# Patient Record
Sex: FEMALE | Race: WHITE | NOT HISPANIC OR LATINO | URBAN - METROPOLITAN AREA
[De-identification: names, ages, dates, MRNs, and addresses within clinical notes are randomized per-mention and may not be internally consistent; named-entity substitution may affect disease eponyms.]

---

## 2019-07-24 ENCOUNTER — OFFICE VISIT (OUTPATIENT)
Dept: URBAN - METROPOLITAN AREA CLINIC 33 | Facility: CLINIC | Age: 84
End: 2019-07-24
Payer: MEDICARE

## 2019-07-24 DIAGNOSIS — E11.9 TYPE 2 DIABETES MELLITUS W/O COMPLICATION: Primary | ICD-10-CM

## 2019-07-24 PROCEDURE — 99203 OFFICE O/P NEW LOW 30 MIN: CPT | Performed by: OPTOMETRIST

## 2019-07-24 PROCEDURE — 99214 OFFICE O/P EST MOD 30 MIN: CPT | Performed by: OPTOMETRIST

## 2019-07-24 RX ORDER — LATANOPROST 50 UG/ML
0.005 % SOLUTION OPHTHALMIC
Qty: 5 | Refills: 6 | Status: INACTIVE
Start: 2019-07-24 | End: 2019-09-09

## 2019-07-24 RX ORDER — DORZOLAMIDE HCL 20 MG/ML
2 % SOLUTION/ DROPS OPHTHALMIC
Qty: 5 | Refills: 12 | Status: INACTIVE
Start: 2019-07-24 | End: 2019-09-09

## 2019-07-24 ASSESSMENT — INTRAOCULAR PRESSURE
OD: 38
OS: 23
OD: 41
OS: 25

## 2019-07-24 ASSESSMENT — KERATOMETRY
OS: 44.13
OD: 44.00

## 2019-07-24 NOTE — IMPRESSION/PLAN
Impression: Type 2 diabetes mellitus w/o complication: N74.7. Plan: Diabetes w/o Complications: No signs of diabetic retinopathy noted. No treatment necessary at this time. Patient was instructed to monitor vision for sudden changes and to call if visual changes noted. Discussed ocular and systemic benefits of blood sugar control. Continue management with PCP. Letter sent to PCP regarding status of ocular health.

## 2019-07-24 NOTE — IMPRESSION/PLAN
Impression: Ocular hypertension, bilateral: H40.053. Last HVF 04/2016. SLT 02/2016 HVF 04/2016, /17, 12/17 OCT 76/81 6/7 Plan: IOP 38/23 without medication. Patient reports discontinuing both glaucoma drops. Recommend patient restart Latanoprost OU QHS and Dorzolamide OU BID. RTC with Dr Ericka Solares for IOP check and HVF 24-2 in 3-4 weeks. RXd Dorzolamide and Latanoprost

## 2019-08-21 ENCOUNTER — OFFICE VISIT (OUTPATIENT)
Dept: URBAN - METROPOLITAN AREA CLINIC 33 | Facility: CLINIC | Age: 84
End: 2019-08-21
Payer: MEDICARE

## 2019-08-21 PROCEDURE — 92133 CPTRZD OPH DX IMG PST SGM ON: CPT | Performed by: OPHTHALMOLOGY

## 2019-08-21 PROCEDURE — 92083 EXTENDED VISUAL FIELD XM: CPT | Performed by: OPHTHALMOLOGY

## 2019-08-21 PROCEDURE — 99213 OFFICE O/P EST LOW 20 MIN: CPT | Performed by: OPHTHALMOLOGY

## 2019-08-21 ASSESSMENT — INTRAOCULAR PRESSURE
OD: 26
OS: 27

## 2019-08-21 NOTE — IMPRESSION/PLAN
Impression: Ocular hypertension, bilateral: H40.053
- Tmax 41
-Goal 27 below Plan:  Intraocular pressure well controlled, tolerating medications. Will continue with same regimen. Pt obtained HVF and OCT today. Stable OU. Reviewed with Patient. Will continue to Monitor. 
Gtts:  Latanoprost OU QHS and Dorzolamide OU BID
RTC: 3 month IOP

## 2019-12-11 ENCOUNTER — OFFICE VISIT (OUTPATIENT)
Dept: URBAN - METROPOLITAN AREA CLINIC 33 | Facility: CLINIC | Age: 84
End: 2019-12-11
Payer: MEDICARE

## 2019-12-11 PROCEDURE — 99213 OFFICE O/P EST LOW 20 MIN: CPT | Performed by: OPHTHALMOLOGY

## 2019-12-11 RX ORDER — DORZOLAMIDE HYDROCHLORIDE AND TIMOLOL MALEATE 20; 5 MG/ML; MG/ML
SOLUTION/ DROPS OPHTHALMIC
Qty: 1 | Refills: 6 | Status: INACTIVE
Start: 2019-12-11 | End: 2019-12-11

## 2019-12-11 ASSESSMENT — INTRAOCULAR PRESSURE
OD: 40
OS: 32

## 2019-12-11 NOTE — IMPRESSION/PLAN
Impression: Ocular hypertension, bilateral: H40.053
- Tmax 41
-Goal 27 below Plan: IOP elevated today but Last VF no changes or defects. Need to manage and lower IOP will change drop regimen, Discussed possible SLT in future. Last SLT 3 years ago. .  Discussed selective laser trabeculoplasty as an option. Reviewed risks and benefits of selective laser trabeculoplasty including 20% chance of laser not reducing intraocular pressure and continued need for eye drops. Will have patient START Cosopt BID OU & continue  Latanoprost QHS OU
STOP Dorzolamide. Patient expressed understanding.   Intraocular pressure slightly elevated on medications

## 2019-12-17 ENCOUNTER — OFFICE VISIT (OUTPATIENT)
Dept: URBAN - METROPOLITAN AREA CLINIC 33 | Facility: CLINIC | Age: 84
End: 2019-12-17
Payer: MEDICARE

## 2019-12-17 PROCEDURE — 99213 OFFICE O/P EST LOW 20 MIN: CPT | Performed by: OPHTHALMOLOGY

## 2019-12-17 ASSESSMENT — INTRAOCULAR PRESSURE
OS: 18
OD: 24
OS: 20

## 2019-12-17 NOTE — IMPRESSION/PLAN
Impression: Ocular hypertension, bilateral: H40.053
- Tmax 41
-Goal 27 below Plan: IOP improved today. Continue drops as directed. Discussed possible SLT in future. Last SLT 3 years ago. .  Discussed selective laser trabeculoplasty as an option. Reviewed risks and benefits of selective laser trabeculoplasty including 20% chance of laser not reducing intraocular pressure and continued need for eye drops. Will have patient continue Cosopt BID OU & continue  Latanoprost QHS OU
STOP Dorzolamide. Patient expressed understanding.   Intraocular pressure slightly elevated on medications

## 2020-06-23 ENCOUNTER — OFFICE VISIT (OUTPATIENT)
Dept: URBAN - METROPOLITAN AREA CLINIC 33 | Facility: CLINIC | Age: 85
End: 2020-06-23
Payer: MEDICARE

## 2020-06-23 PROCEDURE — 99213 OFFICE O/P EST LOW 20 MIN: CPT | Performed by: OPHTHALMOLOGY

## 2020-06-23 PROCEDURE — 92133 CPTRZD OPH DX IMG PST SGM ON: CPT | Performed by: OPHTHALMOLOGY

## 2020-06-23 ASSESSMENT — INTRAOCULAR PRESSURE
OD: 26
OS: 24

## 2020-06-23 NOTE — IMPRESSION/PLAN
Impression: Ocular hypertension, bilateral: H40.053
- Tmax 41
-Goal 27 below Plan: Elevated IOP today. Discussed possible SLT in future- Will see if Dr Ana Miller or Dr Braulio Cohen can do SLT for patient. Last SLT 2006 /ALT ? 15 years ago. Discussed selective laser trabeculoplasty as an option. Reviewed risks and benefits of selective laser trabeculoplasty including 20% chance of laser not reducing intraocular pressure and continued need for eye drops. Will have patient continue Cosopt BID OU & continue  Latanoprost QHS OU Pt to continue artificial tears ou. Patient expressed understanding. Intraocular pressure slightly elevated on medications and patient is not happy with use of drops they irritate her eyes.

## 2020-09-29 ENCOUNTER — OFFICE VISIT (OUTPATIENT)
Dept: URBAN - METROPOLITAN AREA CLINIC 33 | Facility: CLINIC | Age: 85
End: 2020-09-29
Payer: MEDICARE

## 2020-09-29 PROCEDURE — 99213 OFFICE O/P EST LOW 20 MIN: CPT | Performed by: OPHTHALMOLOGY

## 2020-09-29 ASSESSMENT — INTRAOCULAR PRESSURE
OD: 27
OS: 24

## 2020-09-29 NOTE — IMPRESSION/PLAN
Impression: Ocular hypertension, bilateral: H40.053
- Tmax 41
-Goal 27 below
-Last SLT 2006 /ALT ? 15 years ago Plan: Intraocular pressure slightly elevated on medications and patient is not happy with use of drops they irritate her eyes. Discussed possible SLT in future-  Will call to coordinate with patient if can get mobile SLT to [id number] [facility] for Dr Efren Spence to do if not Will have patient see Dr Braulio Cohen for consult at [facility] or any [facility] office and SLT procedure. . Dr Efren Spence will be in contact with Dr Braulio Cohen. Patient to continue Cosopt BID OU & Latanoprost QHS OU as well as artificial tears BID ou.

## 2020-10-05 ENCOUNTER — CONSULT (OUTPATIENT)
Dept: URBAN - METROPOLITAN AREA CLINIC 10 | Facility: CLINIC | Age: 85
End: 2020-10-05
Payer: MEDICARE

## 2020-10-05 PROCEDURE — 99204 OFFICE O/P NEW MOD 45 MIN: CPT | Performed by: OPHTHALMOLOGY

## 2020-10-05 PROCEDURE — 65855 TRABECULOPLASTY LASER SURG: CPT | Performed by: OPHTHALMOLOGY

## 2020-10-05 PROCEDURE — 92020 GONIOSCOPY: CPT | Performed by: OPHTHALMOLOGY

## 2020-10-05 PROCEDURE — 76514 ECHO EXAM OF EYE THICKNESS: CPT | Performed by: OPHTHALMOLOGY

## 2020-10-05 ASSESSMENT — INTRAOCULAR PRESSURE
OS: 21
OD: 28

## 2020-10-27 ENCOUNTER — OFFICE VISIT (OUTPATIENT)
Dept: URBAN - METROPOLITAN AREA CLINIC 33 | Facility: CLINIC | Age: 85
End: 2020-10-27
Payer: MEDICARE

## 2020-10-27 PROCEDURE — 99213 OFFICE O/P EST LOW 20 MIN: CPT | Performed by: OPHTHALMOLOGY

## 2020-10-27 RX ORDER — DORZOLAMIDE HYDROCHLORIDE AND TIMOLOL MALEATE 20; 5 MG/ML; MG/ML
SOLUTION/ DROPS OPHTHALMIC
Qty: 1 | Refills: 1 | Status: INACTIVE
Start: 2020-10-27 | End: 2021-03-22

## 2020-10-27 RX ORDER — LATANOPROST 50 UG/ML
0.005 % SOLUTION OPHTHALMIC
Qty: 1 | Refills: 1 | Status: INACTIVE
Start: 2020-10-27 | End: 2020-12-14

## 2020-10-27 ASSESSMENT — INTRAOCULAR PRESSURE
OS: 24
OD: 43

## 2020-10-27 NOTE — IMPRESSION/PLAN
Impression: Ocular hypertension, bilateral: H40.053
- Tmax 41
-Goal 27 below
-Last SLT 2006 /ALT ? 15 years ago Plan: Intraocular pressure slightly elevated on only Latanoprost qhs ou. Pt stopped Dorz/Timolol since SLT Laser with Dr Iron Machado on 10/5/20. Explained to patient importance of controlled IOP and Drop usage. Will have Pt RESTART Dorzolamide/ Timolol BID OU & Latanoprost QHS OU Recheck IOP in 1 week

## 2020-11-10 ENCOUNTER — OFFICE VISIT (OUTPATIENT)
Dept: URBAN - METROPOLITAN AREA CLINIC 33 | Facility: CLINIC | Age: 85
End: 2020-11-10
Payer: MEDICARE

## 2020-11-10 PROCEDURE — 99213 OFFICE O/P EST LOW 20 MIN: CPT | Performed by: OPHTHALMOLOGY

## 2020-11-10 ASSESSMENT — INTRAOCULAR PRESSURE
OD: 34
OS: 25

## 2020-11-10 NOTE — IMPRESSION/PLAN
Impression: Ocular hypertension, bilateral: H40.053
- Tmax 41
-Goal 27 below
-Last SLT 2006 /ALT ? 15 years ago Plan: Intraocular pressure slightly elevated on only Dorzolamide/Timolol BID for 4 days only was using Dorzolamide and Latanoprost qhs ou. Explained to patient importance of controlled IOP and Drop usage. Will have Pt Recheck IOP in 1 month

## 2021-01-19 ENCOUNTER — OFFICE VISIT (OUTPATIENT)
Dept: URBAN - METROPOLITAN AREA CLINIC 33 | Facility: CLINIC | Age: 86
End: 2021-01-19
Payer: MEDICARE

## 2021-01-19 PROCEDURE — 99213 OFFICE O/P EST LOW 20 MIN: CPT | Performed by: OPHTHALMOLOGY

## 2021-01-19 ASSESSMENT — INTRAOCULAR PRESSURE
OD: 25
OS: 23

## 2021-01-19 NOTE — IMPRESSION/PLAN
Impression: Ocular hypertension, bilateral: H40.053
- Tmax 41
-Goal 27 below
-Last SLT 2006 /ALT ? 15 years ago Plan: Intraocular pressure improved on Dorzolamide/Timolol BID OU and Latanoprost QHS OU. Tolerating medications. Will continue to monitor. Explained to patient importance of controlled IOP and Drop usage.  
RTC: 3-4 months IOP

## 2021-04-20 ENCOUNTER — OFFICE VISIT (OUTPATIENT)
Dept: URBAN - METROPOLITAN AREA CLINIC 33 | Facility: CLINIC | Age: 86
End: 2021-04-20
Payer: MEDICARE

## 2021-04-20 PROCEDURE — 99213 OFFICE O/P EST LOW 20 MIN: CPT | Performed by: OPHTHALMOLOGY

## 2021-04-20 RX ORDER — DORZOLAMIDE HYDROCHLORIDE AND TIMOLOL MALEATE 20; 5 MG/ML; MG/ML
SOLUTION/ DROPS OPHTHALMIC
Qty: 3 | Refills: 1 | Status: INACTIVE
Start: 2021-04-20 | End: 2022-02-24

## 2021-04-20 RX ORDER — LATANOPROST 50 UG/ML
0.005 % SOLUTION OPHTHALMIC
Qty: 3 | Refills: 1 | Status: INACTIVE
Start: 2021-04-20 | End: 2021-09-07

## 2021-04-20 RX ORDER — NETARSUDIL 0.2 MG/ML
0.02 % SOLUTION/ DROPS OPHTHALMIC; TOPICAL
Qty: 3 | Refills: 1 | Status: INACTIVE
Start: 2021-04-20 | End: 2022-01-18

## 2021-04-20 ASSESSMENT — INTRAOCULAR PRESSURE
OD: 35
OS: 25

## 2021-04-20 NOTE — IMPRESSION/PLAN
Impression: Ocular hypertension, bilateral: H40.053
- Tmax 41
-Goal 27 below
-Last SLT 2006 /ALT ? 15 years ago Plan: Intraocular pressure elevated OD>OS. On Dorzolamide/Timolol BID OU and Latanoprost QHS OU. Tolerating medications. Will continue to monitor and continue  Dorz/timolol, Latanoprost QHS and will add Rhopressa QHS OU. Explained to patient importance of controlled IOP and Drop usage.  
RTC: 6 weeks IOP/VFT

## 2021-06-08 ENCOUNTER — OFFICE VISIT (OUTPATIENT)
Dept: URBAN - METROPOLITAN AREA CLINIC 33 | Facility: CLINIC | Age: 86
End: 2021-06-08
Payer: MEDICARE

## 2021-06-08 DIAGNOSIS — H40.053 OCULAR HYPERTENSION, BILATERAL: Primary | ICD-10-CM

## 2021-06-08 DIAGNOSIS — H40.013 OPEN ANGLE WITH BORDERLINE FINDINGS, LOW RISK, BILATERAL: ICD-10-CM

## 2021-06-08 PROCEDURE — 92083 EXTENDED VISUAL FIELD XM: CPT | Performed by: OPHTHALMOLOGY

## 2021-06-08 PROCEDURE — 99213 OFFICE O/P EST LOW 20 MIN: CPT | Performed by: OPHTHALMOLOGY

## 2021-06-08 ASSESSMENT — INTRAOCULAR PRESSURE
OD: 30
OS: 21

## 2021-06-08 NOTE — IMPRESSION/PLAN
Impression: Ocular hypertension, bilateral: H40.053
- Tmax 41
-Goal 27 below
-Last SLT 2006 /ALT ? 15 years ago Plan: Intraocular pressure decreased OD>OS. VF is stable. On Dorzolamide/Timolol BID OU , Latanoprost QHS OU,  Rhopressa QHS OU. Tolerating medications. Will continue to monitor. Explained to patient importance of controlled IOP and Drop usage.  
RTC: 4 months IOP/DFE

## 2022-01-18 ENCOUNTER — OFFICE VISIT (OUTPATIENT)
Dept: URBAN - METROPOLITAN AREA CLINIC 33 | Facility: CLINIC | Age: 87
End: 2022-01-18
Payer: MEDICARE

## 2022-01-18 DIAGNOSIS — H04.123 DRY EYE SYNDROME OF BILATERAL LACRIMAL GLANDS: ICD-10-CM

## 2022-01-18 DIAGNOSIS — H43.813 VITREOUS DEGENERATION, BILATERAL: ICD-10-CM

## 2022-01-18 DIAGNOSIS — H02.831 DERMATOCHALASIS OF RIGHT UPPER EYELID: ICD-10-CM

## 2022-01-18 DIAGNOSIS — H02.834 DERMATOCHALASIS OF LEFT UPPER EYELID: ICD-10-CM

## 2022-01-18 PROCEDURE — 99214 OFFICE O/P EST MOD 30 MIN: CPT | Performed by: OPHTHALMOLOGY

## 2022-01-18 RX ORDER — NETARSUDIL 0.2 MG/ML
0.02 % SOLUTION/ DROPS OPHTHALMIC; TOPICAL
Qty: 3 | Refills: 1 | Status: INACTIVE
Start: 2022-01-18 | End: 2022-02-24

## 2022-01-18 ASSESSMENT — INTRAOCULAR PRESSURE
OD: 39
OS: 22
OD: 34

## 2022-01-18 ASSESSMENT — VISUAL ACUITY
OS: 20/30
OD: 20/30

## 2022-01-18 NOTE — IMPRESSION/PLAN
Impression: Type 2 diabetes mellitus w/o complication: D96.4. Plan: Diabetes type II: no background retinopathy, no signs of neovascularization noted. Discussed ocular and systemic benefits of blood sugar control.

## 2022-01-18 NOTE — IMPRESSION/PLAN
Impression: Ocular hypertension, bilateral: H40.053. Plan: Patient's IOP continues to rises. She is on maximal medical treatment and hx of SLT (several times, most recently with Dr. Christina Dakins 2 years ago_. HVF in the past has not shown any defects. Patient has not been able to come in past few months for f/u since she has gotten three cardiac surgeries. Will continue current treatment and refer back to Dr. Demario Coker for further evaluation and management. She is unable to travel back to [facility] to see Dr. Christina Dakins. She understands that chronic elevated IOP can lead to blindess without appropriate treatment.

## 2022-01-18 NOTE — IMPRESSION/PLAN
Impression: Dermatochalasis of left upper eyelid: H02.834. Plan: patient notes that ptosis is affecting vision. She is requesting oculoplastics consult for possible surgical repair. Will refer to Dr. Tyrone Scott for evaluation.

## 2022-06-08 ENCOUNTER — OFFICE VISIT (OUTPATIENT)
Dept: URBAN - METROPOLITAN AREA CLINIC 33 | Facility: CLINIC | Age: 87
End: 2022-06-08
Payer: MEDICARE

## 2022-06-08 DIAGNOSIS — H40.053 OCULAR HYPERTENSION, BILATERAL: Primary | ICD-10-CM

## 2022-06-08 DIAGNOSIS — H40.1131 BILATERAL PRIMARY OPEN-ANGLE GLAUCOMA, MILD STAGE: ICD-10-CM

## 2022-06-08 PROCEDURE — 99214 OFFICE O/P EST MOD 30 MIN: CPT | Performed by: OPHTHALMOLOGY

## 2022-06-08 PROCEDURE — 76514 ECHO EXAM OF EYE THICKNESS: CPT | Performed by: OPHTHALMOLOGY

## 2022-06-08 PROCEDURE — 92133 CPTRZD OPH DX IMG PST SGM ON: CPT | Performed by: OPHTHALMOLOGY

## 2022-06-08 PROCEDURE — 92083 EXTENDED VISUAL FIELD XM: CPT | Performed by: OPHTHALMOLOGY

## 2022-06-08 RX ORDER — TIMOLOL MALEATE 5 MG/ML
0.5 % SOLUTION/ DROPS OPHTHALMIC
Qty: 10 | Refills: 3 | Status: ACTIVE
Start: 2022-06-08

## 2022-06-08 RX ORDER — LATANOPROST 50 UG/ML
0.005 % SOLUTION OPHTHALMIC
Qty: 7.5 | Refills: 3 | Status: ACTIVE
Start: 2022-06-08

## 2022-06-08 ASSESSMENT — INTRAOCULAR PRESSURE
OD: 74
OS: 34

## 2022-06-08 NOTE — IMPRESSION/PLAN
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 Plan: PT HAS POAG OU     GONIO :         PACHS:  544/550 PT WAS REFERRED BY DR. Jeane Perea TO CONTINUE [address] CARE  
THE RIGHT EYE IS THE POORER SEEING EYE LEFT EYE IS THE BETTER SEEING EYE
*****THE PATIENT STOPPED ALL GLAUCOMA DROPS SEVERAL MONTHS PRIOR TO HER VISIT ON  06/08/22********
PT DENIES FAMILY HX OF GLAUCOMA
PT DENIES SULFA ALLERGY
PT DENIES ASTHMA TARGET IOP LOW TEENS OR LESS 
RECOMMEND : 
1. SUGGEST PT  RE START  GLAUCOMA DROPS DUE TO HIGH IOP 6/8/22 2. VF TODAY SHOWS SIGNIFICANT PROGRESSION OD>OS COMPARISON TO VF ON 08/21/21 (06/08/22) 3. RE-START LATANOPROST OU QHS ( 06/08/22) 4. START TIMOLOL 1/2 OU QAM (START 6/8/22) 5. PT DOES NOT WISH TO RE-START DORZOLAMIDE OR RHOPRESSA OR BROMONIDIN (06/08/22) 5. OFFERED PT OPTION OF EMERGENT TRABECULECTOMY OD AND IF NEEDED OS, PT DEFERS SURGERY TO EITHER EYE 6/8/22 6. THE PT IS AWARE THAT THE CURRENT IOP LEVEL IN THE RIGHT EYE IS SEVERELY ELEVATED AND CAN LEAD TO COMPLETE SIGHT LOSS OVER A VERY BRIEF PERIOD OF TIME AND EVEN IN LIGHT OF THIS KNOWLEDGE, ADAMANTLY DEFERS SURGICAL INTERVENTION. 7. RECOMMEND THE PT RETURN IN 1 MONTH OR SOONER IF SHE WISHES TO CONSIDER EMERGENT SURGERY OD AND BRING FAMILY MEMBER OR FRIEND WITH HER TO HELP ANSWER ANY QUESTIONS REGARDING HER CASE

## 2022-07-06 ENCOUNTER — OFFICE VISIT (OUTPATIENT)
Dept: URBAN - METROPOLITAN AREA CLINIC 33 | Facility: CLINIC | Age: 87
End: 2022-07-06
Payer: MEDICARE

## 2022-07-06 DIAGNOSIS — H40.1131 BILATERAL PRIMARY OPEN-ANGLE GLAUCOMA, MILD STAGE: Primary | ICD-10-CM

## 2022-07-06 PROCEDURE — 99213 OFFICE O/P EST LOW 20 MIN: CPT | Performed by: OPHTHALMOLOGY

## 2022-07-06 ASSESSMENT — INTRAOCULAR PRESSURE
OS: 24
OD: 48

## 2022-07-06 NOTE — IMPRESSION/PLAN
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 Plan: PT HAS POAG OU        PACHS:  544/550 PT WAS REFERRED BY DR. Katherin Singh TO CONTINUE [address] CARE  
THE RIGHT EYE IS THE POORER SEEING EYE LEFT EYE IS THE BETTER SEEING EYE
*****THE PATIENT STOPPED ALL GLAUCOMA DROPS SEVERAL MONTHS PRIOR TO HER VISIT ON  06/08/22********
PT DENIES FAMILY HX OF GLAUCOMA
PT DENIES SULFA ALLERGY
PT DENIES ASTHMA TARGET IOP LOW TEENS OR LESS 
RECOMMEND : 
1. CONTINUE LATANOPROST OU QHS ( RE STARTED 06/08/22) 2. CONTINUE TIMOLOL 1/2 OU QAM (RE STARTED  6/8/22) 3. VF ON 6/8/22 SHOWED SIGNIFICANT GLAUCOMATOUS PROGRESSION OD>OS COMPARED TO VF ON 08/21/21 5. PT DOES NOT WISH TO RE-START DORZOLAMIDE OR RHOPRESSA OR BROMONIDINE (06/08/22-7/6/22 
5. OFFERED PT OPTION OF EMERGENT TRABECULECTOMY OD AND IF NEEDED OS, PT DEFERS SURGERY OU 6/8/22-07/06/22 6. THE PT IS AWARE CURRENT IOP OD REMAINS SEVERELY ELEVATED AND CAN LEAD TO COMPLETE SIGHT LOSS . 7. RECOMMEND THE PT SEE DR Mica Restrepo IN CONSULTATION  AND BRING FAMILY MEMBER OR FRIEND WITH HER

## 2022-07-18 ENCOUNTER — OFFICE VISIT (OUTPATIENT)
Dept: URBAN - METROPOLITAN AREA CLINIC 10 | Facility: CLINIC | Age: 87
End: 2022-07-18
Payer: MEDICARE

## 2022-07-18 DIAGNOSIS — H40.1131 BILATERAL PRIMARY OPEN-ANGLE GLAUCOMA, MILD STAGE: Primary | ICD-10-CM

## 2022-07-18 PROCEDURE — 99214 OFFICE O/P EST MOD 30 MIN: CPT | Performed by: OPHTHALMOLOGY

## 2022-07-18 PROCEDURE — 92133 CPTRZD OPH DX IMG PST SGM ON: CPT | Performed by: OPHTHALMOLOGY

## 2022-07-18 PROCEDURE — 92020 GONIOSCOPY: CPT | Performed by: OPHTHALMOLOGY

## 2022-07-18 RX ORDER — PREDNISOLONE ACETATE 10 MG/ML
1 % SUSPENSION/ DROPS OPHTHALMIC
Qty: 5 | Refills: 1 | Status: ACTIVE
Start: 2022-07-18

## 2022-07-18 RX ORDER — OFLOXACIN 3 MG/ML
0.3 % SOLUTION/ DROPS OPHTHALMIC
Qty: 5 | Refills: 1 | Status: ACTIVE
Start: 2022-07-18

## 2022-07-18 ASSESSMENT — INTRAOCULAR PRESSURE
OD: 48
OS: 20

## 2022-07-18 NOTE — IMPRESSION/PLAN
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 Plan: Pt has Glaucoma    Gonio :  CBB      Pachs: 544/550     Today's IOP :  48/20       Tmax  :  74/34 Target IOP low teens Pt denies Fhx of Glaucoma Left eye is the better seeing eye HVF: dense peripheral loss OD, normal OS (6/8/22) C/D: .8x.8//.4x.4 
OCT: 51/78 (7/18/22) Pt denies Sulfa Allergy   // Pt denies Lung /Heart dx Plan :
1. Continue Latanoprost QHS OU Timolol QAM OU Discussed details about Glaucoma and that without proper control of pressures irreversible blindness can occur. Patient understands risks. Emphasize compliance with drop and without compliance vision loss progression can occur. 2. Discuss observation vs. Xen vs. Diode - pt opts to have Xen OD (schedule for Aug 1st) The Patient is aware that the risks of Xen include but are not limited to complete blindness , loss of vision and loss of the eye, bleeding, infection, inflammation, Hypotony, Persistent IOP elevation, intractable diplopia , and orbital or ocular inflammation. The Patient is aware that there is a 10 % chance of permanent double vision which cannot be remedied by any means including glasses or strabismus surgery. The patient is aware that failure to lower IOP will likely lead to progressive sight loss and possibly blindness . Pt understands and wishes to proceed. *20 minutes/points **PCC's Please notify OR to order device and Mitomycin 4% (allow 1 week for mitomycin to be ordered)

## 2022-07-22 ENCOUNTER — ADULT PHYSICAL (OUTPATIENT)
Dept: URBAN - METROPOLITAN AREA CLINIC 10 | Facility: CLINIC | Age: 87
End: 2022-07-22
Payer: MEDICARE

## 2022-07-22 DIAGNOSIS — Z01.818 ENCOUNTER FOR OTHER PREPROCEDURAL EXAMINATION: Primary | ICD-10-CM

## 2022-07-22 PROCEDURE — 99202 OFFICE O/P NEW SF 15 MIN: CPT | Performed by: PHYSICIAN ASSISTANT

## 2022-08-02 ENCOUNTER — POST-OPERATIVE VISIT (OUTPATIENT)
Dept: URBAN - METROPOLITAN AREA CLINIC 10 | Facility: CLINIC | Age: 87
End: 2022-08-02

## 2022-08-02 DIAGNOSIS — Z48.810 ENCOUNTER FOR SURGICAL AFTERCARE FOLLOWING SURGERY ON A SENSE ORGAN: Primary | ICD-10-CM

## 2022-08-02 PROCEDURE — 99024 POSTOP FOLLOW-UP VISIT: CPT | Performed by: OPTOMETRIST

## 2022-08-02 ASSESSMENT — INTRAOCULAR PRESSURE: OD: 16

## 2022-08-02 NOTE — IMPRESSION/PLAN
Impression: S/P Glaucoma surgery with Xen shunt OD - 1 Day. Encounter for surgical aftercare following surgery on a sense organ  Z48.810. Post operative instructions reviewed - Plan: Doing well, IOP Improved. Cont. pred and ofloxacin qid OD. Cont. latanoprost qhs OU and timolol qam OU. 
RTC as scheduled c Dr Isreal Cuellar 8/8/22

## 2022-08-08 ENCOUNTER — POST-OPERATIVE VISIT (OUTPATIENT)
Dept: URBAN - METROPOLITAN AREA CLINIC 10 | Facility: CLINIC | Age: 87
End: 2022-08-08
Payer: MEDICARE

## 2022-08-08 DIAGNOSIS — H40.1131 BILATERAL PRIMARY OPEN-ANGLE GLAUCOMA, MILD STAGE: Primary | ICD-10-CM

## 2022-08-08 PROCEDURE — 99024 POSTOP FOLLOW-UP VISIT: CPT | Performed by: OPHTHALMOLOGY

## 2022-08-08 ASSESSMENT — INTRAOCULAR PRESSURE: OD: 18

## 2022-08-08 NOTE — IMPRESSION/PLAN
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 S/P Glaucoma surgery with Xen shunt OD 8/1/22 - Dr. Leigh Ann Rebolledo Plan: Pt has Glaucoma    Gonio :  CBB      Pachs: 544/550     Today's IOP :  48/20       Tmax  :  74/34 Target IOP low teens Pt denies Fhx of Glaucoma Left eye is the better seeing eye HVF: dense peripheral loss OD, normal OS (6/8/22) C/D: .8x.8//.4x.4 
OCT: 51/78 (7/18/22) Pt denies Sulfa Allergy   // Pt denies Lung /Heart dx Plan :
1. Continue Latanoprost QHS OU Timolol QAM OU Pred QID until next exam OD Discussed details about Glaucoma and that without proper control of pressures irreversible blindness can occur. Patient understands risks. Emphasize compliance with drop and without compliance vision loss progression can occur. 2. s/p Xen shunt OD - good post op course 3.  Return in 1 month for continued post op care - if doing well and bleb not injected okay to start steroid taper

## 2023-02-01 ENCOUNTER — OFFICE VISIT (OUTPATIENT)
Dept: URBAN - METROPOLITAN AREA CLINIC 33 | Facility: CLINIC | Age: 88
End: 2023-02-01
Payer: MEDICARE

## 2023-02-01 PROCEDURE — 99203 OFFICE O/P NEW LOW 30 MIN: CPT | Performed by: OPTOMETRIST

## 2023-02-01 RX ORDER — ACETAZOLAMIDE 250 MG/1
250 MG TABLET ORAL
Qty: 20 | Refills: 0 | Status: ACTIVE
Start: 2023-02-01

## 2023-02-01 RX ORDER — TIMOLOL MALEATE 5 MG/ML
0.5 % SOLUTION/ DROPS OPHTHALMIC
Qty: 5 | Refills: 5 | Status: ACTIVE
Start: 2023-02-01

## 2023-02-01 RX ORDER — BRINZOLAMIDE/BRIMONIDINE TARTRATE 10; 2 MG/ML; MG/ML
SUSPENSION/ DROPS OPHTHALMIC
Qty: 5 | Refills: 3 | Status: ACTIVE
Start: 2023-02-01

## 2023-02-01 ASSESSMENT — INTRAOCULAR PRESSURE
OD: 51
OS: 18

## 2023-02-01 ASSESSMENT — VISUAL ACUITY
OS: 20/30
OD: 20/40

## 2023-02-01 ASSESSMENT — KERATOMETRY
OS: 44.25
OD: 43.75

## 2023-02-01 NOTE — IMPRESSION/PLAN
Impression: Primary open-angle glaucoma, severe stage, right eye: H40.1113.

s/p Xen Shunt OD, 8/1/22 (Dr. Gisela Orozco) TMax 74/34 Plan: IOP 51/18 with Latanoprost QHS OU and Timolol QAM OU. DIscussed concern of vision loss due to elevated IOP. IOP was elevated until Xen Shunt but has since gone back up. Continue Latanoprost QHS OU Increase Timolol BID OD and QAM OS Start Simbrinza TID OD Start Diamox 250mg PO BID

## 2023-02-03 ENCOUNTER — OFFICE VISIT (OUTPATIENT)
Dept: URBAN - METROPOLITAN AREA CLINIC 33 | Facility: CLINIC | Age: 88
End: 2023-02-03
Payer: MEDICARE

## 2023-02-03 DIAGNOSIS — H40.1113 PRIMARY OPEN-ANGLE GLAUCOMA, SEVERE STAGE, RIGHT EYE: Primary | ICD-10-CM

## 2023-02-03 PROCEDURE — 99213 OFFICE O/P EST LOW 20 MIN: CPT | Performed by: OPTOMETRIST

## 2023-02-03 RX ORDER — BRIMONIDINE TARTRATE 1.5 MG/ML
0.15 % SOLUTION/ DROPS OPHTHALMIC
Qty: 5 | Refills: 2 | Status: ACTIVE
Start: 2023-02-03

## 2023-02-03 ASSESSMENT — INTRAOCULAR PRESSURE
OD: 31
OS: 16

## 2023-02-03 NOTE — IMPRESSION/PLAN
Impression: Primary open-angle glaucoma, severe stage, right eye: H40.1113.

s/p Xen Shunt OD, 8/1/22 (Dr. Shakila Rosenthal) TMax 74/34 Plan: IOP 31/18 with Latanoprost QHS OU and Timolol BID OU. Diamox 250mg PO BID. DIscussed concern of vision loss due to elevated IOP. IOP was elevated until Xen Shunt but has since gone back up. Continue Latanoprost QHS OU Continue Timolol BID OD and QAM OS Start Brimonidine BID OD (unable to get simbrinza, expensive) Continue Diamox 250mg PO BID

## 2023-02-10 ENCOUNTER — OFFICE VISIT (OUTPATIENT)
Dept: URBAN - METROPOLITAN AREA CLINIC 33 | Facility: CLINIC | Age: 88
End: 2023-02-10
Payer: MEDICARE

## 2023-02-10 DIAGNOSIS — H40.1113 PRIMARY OPEN-ANGLE GLAUCOMA, SEVERE STAGE, RIGHT EYE: Primary | ICD-10-CM

## 2023-02-10 PROCEDURE — 99213 OFFICE O/P EST LOW 20 MIN: CPT | Performed by: OPTOMETRIST

## 2023-02-10 RX ORDER — DORZOLAMIDE HCL 20 MG/ML
2 % SOLUTION/ DROPS OPHTHALMIC
Qty: 10 | Refills: 3 | Status: ACTIVE
Start: 2023-02-10

## 2023-02-10 RX ORDER — ACETAZOLAMIDE 250 MG/1
250 MG TABLET ORAL
Qty: 20 | Refills: 0 | Status: ACTIVE
Start: 2023-02-10

## 2023-02-10 ASSESSMENT — INTRAOCULAR PRESSURE
OD: 30
OS: 16

## 2023-02-10 NOTE — IMPRESSION/PLAN
Impression: Primary open-angle glaucoma, severe stage, right eye: H40.1113.

s/p Xen Shunt OD, 8/1/22 (Dr. Cm Ren) Tmax 74/34
-unable to get [address]: IOP 30/16 with Latanoprost QHS OU and Timolol BID OU, and Diamox 250mg PO BID. IOP was elevated until Xen Shunt but has since gone back up. Discussed need of additional medication to bring IOP down. Continue Latanoprost QHS OU Continue Timolol BID OD and QAM OS Continue Brimonidine BID OD Start Dorzolamide BID OU (RXd) Continue Diamox 250mg PO BID (refilled today)

-Patient needs to see Dr. Yuri Bahena next week for evaluation and treatment.

## 2023-02-14 ENCOUNTER — OFFICE VISIT (OUTPATIENT)
Dept: URBAN - METROPOLITAN AREA CLINIC 33 | Facility: CLINIC | Age: 88
End: 2023-02-14
Payer: MEDICARE

## 2023-02-14 DIAGNOSIS — Z79.84 LONG TERM (CURRENT) USE OF ORAL ANTIDIABETIC DRUGS: ICD-10-CM

## 2023-02-14 DIAGNOSIS — Z96.1 PRESENCE OF INTRAOCULAR LENS: ICD-10-CM

## 2023-02-14 DIAGNOSIS — H04.123 DRY EYE SYNDROME OF BILATERAL LACRIMAL GLANDS: ICD-10-CM

## 2023-02-14 DIAGNOSIS — H40.012 OPEN ANGLE WITH BORDERLINE FINDINGS, LOW RISK, LEFT EYE: ICD-10-CM

## 2023-02-14 DIAGNOSIS — E11.9 TYPE 2 DIABETES MELLITUS W/O COMPLICATION: ICD-10-CM

## 2023-02-14 DIAGNOSIS — H43.813 VITREOUS DEGENERATION, BILATERAL: ICD-10-CM

## 2023-02-14 DIAGNOSIS — H40.1113 PRIMARY OPEN-ANGLE GLAUCOMA, SEVERE STAGE, RIGHT EYE: Primary | ICD-10-CM

## 2023-02-14 PROCEDURE — 99214 OFFICE O/P EST MOD 30 MIN: CPT | Performed by: STUDENT IN AN ORGANIZED HEALTH CARE EDUCATION/TRAINING PROGRAM

## 2023-02-14 PROCEDURE — 92133 CPTRZD OPH DX IMG PST SGM ON: CPT | Performed by: STUDENT IN AN ORGANIZED HEALTH CARE EDUCATION/TRAINING PROGRAM

## 2023-02-14 PROCEDURE — 92020 GONIOSCOPY: CPT | Performed by: STUDENT IN AN ORGANIZED HEALTH CARE EDUCATION/TRAINING PROGRAM

## 2023-02-14 PROCEDURE — 92083 EXTENDED VISUAL FIELD XM: CPT | Performed by: STUDENT IN AN ORGANIZED HEALTH CARE EDUCATION/TRAINING PROGRAM

## 2023-02-14 RX ORDER — ACETAZOLAMIDE 250 MG/1
250 MG TABLET ORAL
Qty: 40 | Refills: 0 | Status: ACTIVE
Start: 2023-02-14

## 2023-02-14 RX ORDER — OFLOXACIN 3 MG/ML
0.3 % SOLUTION/ DROPS OPHTHALMIC
Qty: 5 | Refills: 0 | Status: ACTIVE
Start: 2023-02-14

## 2023-02-14 RX ORDER — PREDNISOLONE ACETATE 10 MG/ML
1 % SUSPENSION/ DROPS OPHTHALMIC
Qty: 10 | Refills: 1 | Status: ACTIVE
Start: 2023-02-14

## 2023-02-14 ASSESSMENT — INTRAOCULAR PRESSURE
OD: 35
OS: 20

## 2023-02-14 NOTE — IMPRESSION/PLAN
Impression: Primary open-angle glaucoma, severe stage, right eye: H40.1113.
-unable to get [address]: Ocular Surgical History: s/p Xen Shunt OD, 8/1/22 (Dr. Tatiana Shafer), PC IOL OU, SLT OU(2020), SLT KP([id number]) Pachy: [id number] Tmax: 74/34 Target IOP: low teens OD, mid-high teens OS Med Allergies: none Heart / Lung / Kidney disease/sulfa allergy: Pt. denies Gonioscopy: Grade 4 360 2+ tmp
OCT: OD 48 diffuse thin ; OS 77 full HVF: MD OD -27 small central island ; OS -1 grossly full C/D: 0.85/0.3 Better seeing eye:  OS
IOP Today: 35/20 Plan: IOP today is higher OU; at goal OS. VF worse OD, stable OS. RNFL stable OU. Believe IOP OD is too high for amount of glaucoma present at this time. Recommend lowering IOP OD. Glaucoma Tube Shunt is recommended. Glaucoma diagnosis and associated vision loss discussed at length. R/B/A of surgery discussed including pain, bleeding, loss of vision, loss of eye, need for further surgery, double vision, retinal problems, infection, and inflammation. Although surgery is expected to improve the condition, the patient understands it is possible that the condition could worsen in the future. The surgery is being done in an attempt to preserve the current level of vision; vision improvement is not expected. Discussed in detail the commitment of post operative care, patient may need to travel to different offices. Discussed post-op activity restrictions: no bending head below waist, rubbing the eye, straining or lifting over 10 lbs, no swimming, stay out of lobo, dirty areas and shield when sleeping until advised. All questions answered. Pt understand and wishes to proceed. See [facility] to schedule Ahmed tube shunt with patch graft in the RIGHT eye, H&P,  POD1, POW1, POW3 and POW5-6. 
***Clearance? ??  
- Educational material provided. - ERx'd Ofloxacin QID OD, and Prednisolone Acetate QID OD as requested. - Continue Glaucoma medications as directed before surgery. After surgery, patient will stop glaucoma drops ONLY in the surgical eye. Start surgical drops when eye patch is removed 5hrs after surgery. Drops:  Continue: Latanoprost QHS OU, Timolol BID OD and QAM OS, Brimonidine BID OD, Dorzolamide BID OU, Diamox 250mg BID PO Discussed natural history of glaucoma and that irreversible vision loss can occur without adequate IOP control. Emphasized compliance with eyedrops and other treatment described above.

## 2023-02-14 NOTE — IMPRESSION/PLAN
Impression: Type 2 diabetes mellitus w/o complication: R83.9. Plan: (-) NVI, NVD, NVA OU. Discussed importance of yearly DFE and tight blood glucose control. Pt. understands poor glucose control can lead to retinopathy and vision loss.

## 2023-03-02 ENCOUNTER — SURGERY (OUTPATIENT)
Dept: URBAN - METROPOLITAN AREA SURGERY 15 | Facility: SURGERY | Age: 88
End: 2023-03-02
Payer: MEDICARE

## 2023-03-02 PROCEDURE — 66180 AQUEOUS SHUNT EYE W/GRAFT: CPT | Performed by: STUDENT IN AN ORGANIZED HEALTH CARE EDUCATION/TRAINING PROGRAM

## 2023-03-03 ENCOUNTER — POST-OPERATIVE VISIT (OUTPATIENT)
Dept: URBAN - METROPOLITAN AREA CLINIC 33 | Facility: CLINIC | Age: 88
End: 2023-03-03
Payer: MEDICARE

## 2023-03-03 DIAGNOSIS — Z96.1 PRESENCE OF INTRAOCULAR LENS: Primary | ICD-10-CM

## 2023-03-03 PROCEDURE — 99024 POSTOP FOLLOW-UP VISIT: CPT | Performed by: OPTOMETRIST

## 2023-03-03 ASSESSMENT — INTRAOCULAR PRESSURE
OS: 14
OD: 13

## 2023-03-03 NOTE — IMPRESSION/PLAN
Impression: S/P Ahmed Valve with Tutoplast Processed Pericardium OD - 1 Day. Presence of intraocular lens  Z96.1. IOP lowered Plan: Keep PO2 w/ Dr. Rosalinda Carey Start Ofloxacin QID OD Start Prednisolone QID OD Continue Latanoprost QHS OU Stop Timolol, Brimonidine, and Dorzolamide

## 2023-04-04 ENCOUNTER — POST-OPERATIVE VISIT (OUTPATIENT)
Dept: URBAN - METROPOLITAN AREA CLINIC 33 | Facility: CLINIC | Age: 88
End: 2023-04-04
Payer: MEDICARE

## 2023-04-04 DIAGNOSIS — Z48.810 ENCOUNTER FOR SURGICAL AFTERCARE FOLLOWING SURGERY ON A SENSE ORGAN: Primary | ICD-10-CM

## 2023-04-04 PROCEDURE — 99024 POSTOP FOLLOW-UP VISIT: CPT

## 2023-04-04 RX ORDER — LATANOPROST 50 UG/ML
0.005 % SOLUTION OPHTHALMIC
Qty: 7.5 | Refills: 2 | Status: ACTIVE
Start: 2023-04-04

## 2023-04-04 RX ORDER — DORZOLAMIDE HYDROCHLORIDE AND TIMOLOL MALEATE 20; 5 MG/ML; MG/ML
SOLUTION/ DROPS OPHTHALMIC
Qty: 10 | Refills: 5 | Status: ACTIVE
Start: 2023-04-04

## 2023-04-04 ASSESSMENT — INTRAOCULAR PRESSURE
OD: 21
OS: 21

## 2023-04-04 NOTE — IMPRESSION/PLAN
Impression: S/P Ahmed Valve with Tutoplast Processed Pericardium OD - 33 Days. Encounter for surgical aftercare following surgery on a sense organ  Z48.810. IOPs 21/21 with Dorzolamide-Timolol BID OU and Latanoprost QHS OS. IOP WNL. Continue all drops.  Plan:

## 2023-08-04 ENCOUNTER — OFFICE VISIT (OUTPATIENT)
Dept: URBAN - METROPOLITAN AREA CLINIC 33 | Facility: CLINIC | Age: 88
End: 2023-08-04
Payer: MEDICARE

## 2023-08-04 DIAGNOSIS — H40.1113 PRIMARY OPEN-ANGLE GLAUCOMA, SEVERE STAGE, RIGHT EYE: ICD-10-CM

## 2023-08-04 DIAGNOSIS — Z48.810 ENCOUNTER FOR SURGICAL AFTERCARE FOLLOWING SURGERY ON A SENSE ORGAN: Primary | ICD-10-CM

## 2023-08-04 PROCEDURE — 99213 OFFICE O/P EST LOW 20 MIN: CPT

## 2023-08-04 ASSESSMENT — INTRAOCULAR PRESSURE
OS: 18
OD: 22
OS: 16

## 2023-12-05 ENCOUNTER — OFFICE VISIT (OUTPATIENT)
Dept: URBAN - METROPOLITAN AREA CLINIC 33 | Facility: CLINIC | Age: 88
End: 2023-12-05
Payer: MEDICARE

## 2023-12-05 DIAGNOSIS — H40.1113 PRIMARY OPEN-ANGLE GLAUCOMA, RIGHT EYE, SEVERE STAGE: Primary | ICD-10-CM

## 2023-12-05 PROCEDURE — 99213 OFFICE O/P EST LOW 20 MIN: CPT

## 2023-12-05 PROCEDURE — 92083 EXTENDED VISUAL FIELD XM: CPT

## 2023-12-05 ASSESSMENT — INTRAOCULAR PRESSURE
OD: 17
OD: 18
OS: 14
OS: 18

## 2023-12-05 ASSESSMENT — VISUAL ACUITY
OD: 20/70
OS: 20/25

## 2024-03-06 ENCOUNTER — OFFICE VISIT (OUTPATIENT)
Dept: URBAN - METROPOLITAN AREA CLINIC 33 | Facility: CLINIC | Age: 89
End: 2024-03-06
Payer: MEDICARE

## 2024-03-06 DIAGNOSIS — H40.1113 PRIMARY OPEN-ANGLE GLAUCOMA, SEVERE STAGE, RIGHT EYE: Primary | ICD-10-CM

## 2024-03-06 DIAGNOSIS — E11.9 TYPE 2 DIABETES MELLITUS W/O COMPLICATION: ICD-10-CM

## 2024-03-06 DIAGNOSIS — Z96.1 PRESENCE OF INTRAOCULAR LENS: ICD-10-CM

## 2024-03-06 PROCEDURE — 99213 OFFICE O/P EST LOW 20 MIN: CPT

## 2024-03-06 PROCEDURE — 92133 CPTRZD OPH DX IMG PST SGM ON: CPT

## 2024-03-06 ASSESSMENT — INTRAOCULAR PRESSURE
OD: 14
OS: 14

## 2024-06-05 ENCOUNTER — OFFICE VISIT (OUTPATIENT)
Dept: URBAN - METROPOLITAN AREA CLINIC 33 | Facility: CLINIC | Age: 89
End: 2024-06-05
Payer: MEDICARE

## 2024-06-05 DIAGNOSIS — H52.223 REGULAR ASTIGMATISM, BILATERAL: ICD-10-CM

## 2024-06-05 DIAGNOSIS — H40.1113 PRIMARY OPEN-ANGLE GLAUCOMA, RIGHT EYE, SEVERE STAGE: Primary | ICD-10-CM

## 2024-06-05 PROCEDURE — 99213 OFFICE O/P EST LOW 20 MIN: CPT

## 2024-06-05 ASSESSMENT — VISUAL ACUITY
OD: 20/70
OS: 20/30

## 2024-06-05 ASSESSMENT — INTRAOCULAR PRESSURE
OS: 14
OD: 13

## 2024-10-09 ENCOUNTER — OFFICE VISIT (OUTPATIENT)
Dept: URBAN - METROPOLITAN AREA CLINIC 33 | Facility: CLINIC | Age: 89
End: 2024-10-09
Payer: MEDICARE

## 2024-10-09 DIAGNOSIS — H40.012 OPEN ANGLE WITH BORDERLINE FINDINGS, LOW RISK, LEFT EYE: ICD-10-CM

## 2024-10-09 DIAGNOSIS — H40.1113 PRIMARY OPEN-ANGLE GLAUCOMA, SEVERE STAGE, RIGHT EYE: Primary | ICD-10-CM

## 2024-10-09 PROCEDURE — 99213 OFFICE O/P EST LOW 20 MIN: CPT

## 2024-10-09 PROCEDURE — 92083 EXTENDED VISUAL FIELD XM: CPT

## 2024-10-09 ASSESSMENT — INTRAOCULAR PRESSURE
OD: 17
OD: 14
OS: 15
OS: 19

## 2025-02-10 ENCOUNTER — OFFICE VISIT (OUTPATIENT)
Dept: URBAN - METROPOLITAN AREA CLINIC 33 | Facility: CLINIC | Age: OVER 89
End: 2025-02-10
Payer: MEDICARE

## 2025-02-10 DIAGNOSIS — H40.012 OPEN ANGLE WITH BORDERLINE FINDINGS, LOW RISK, LEFT EYE: ICD-10-CM

## 2025-02-10 DIAGNOSIS — H40.1113 PRIMARY OPEN-ANGLE GLAUCOMA, SEVERE STAGE, RIGHT EYE: ICD-10-CM

## 2025-02-10 DIAGNOSIS — Z96.1 PRESENCE OF INTRAOCULAR LENS: ICD-10-CM

## 2025-02-10 DIAGNOSIS — E11.9 TYPE 2 DIABETES MELLITUS W/O COMPLICATION: Primary | ICD-10-CM

## 2025-02-10 PROCEDURE — 92133 CPTRZD OPH DX IMG PST SGM ON: CPT

## 2025-02-10 PROCEDURE — 99213 OFFICE O/P EST LOW 20 MIN: CPT

## 2025-02-10 ASSESSMENT — KERATOMETRY
OS: 44.00
OD: 43.88

## 2025-02-10 ASSESSMENT — VISUAL ACUITY
OS: 20/30
OD: HM

## 2025-02-10 ASSESSMENT — INTRAOCULAR PRESSURE
OD: 15
OS: 14
OD: 19

## 2025-06-09 ENCOUNTER — OFFICE VISIT (OUTPATIENT)
Dept: URBAN - METROPOLITAN AREA CLINIC 33 | Facility: CLINIC | Age: OVER 89
End: 2025-06-09
Payer: MEDICARE

## 2025-06-09 DIAGNOSIS — H40.1113 PRIMARY OPEN-ANGLE GLAUCOMA, RIGHT EYE, SEVERE STAGE: Primary | ICD-10-CM

## 2025-06-09 PROCEDURE — 99213 OFFICE O/P EST LOW 20 MIN: CPT

## 2025-06-09 ASSESSMENT — INTRAOCULAR PRESSURE
OD: 16
OS: 16